# Patient Record
Sex: MALE | Race: WHITE | NOT HISPANIC OR LATINO | Employment: FULL TIME | URBAN - METROPOLITAN AREA
[De-identification: names, ages, dates, MRNs, and addresses within clinical notes are randomized per-mention and may not be internally consistent; named-entity substitution may affect disease eponyms.]

---

## 2020-02-12 ENCOUNTER — OFFICE VISIT CONVERTED (OUTPATIENT)
Dept: GASTROENTEROLOGY | Facility: CLINIC | Age: 45
End: 2020-02-12
Attending: NURSE PRACTITIONER

## 2020-03-20 ENCOUNTER — HOSPITAL ENCOUNTER (OUTPATIENT)
Dept: GASTROENTEROLOGY | Facility: HOSPITAL | Age: 45
Setting detail: HOSPITAL OUTPATIENT SURGERY
Discharge: HOME | End: 2020-03-20
Attending: INTERNAL MEDICINE

## 2020-04-06 ENCOUNTER — TELEMEDICINE CONVERTED (OUTPATIENT)
Dept: GASTROENTEROLOGY | Facility: CLINIC | Age: 45
End: 2020-04-06
Attending: NURSE PRACTITIONER

## 2020-06-18 ENCOUNTER — HOSPITAL ENCOUNTER (OUTPATIENT)
Dept: MRI IMAGING | Facility: HOSPITAL | Age: 45
Discharge: HOME | End: 2020-06-18
Attending: FAMILY MEDICINE

## 2020-11-25 ENCOUNTER — HOSPITAL ENCOUNTER (OUTPATIENT)
Dept: OTHER | Facility: HOSPITAL | Age: 45
Discharge: HOME | End: 2020-11-25
Attending: FAMILY MEDICINE

## 2020-12-01 ENCOUNTER — OFFICE VISIT CONVERTED (OUTPATIENT)
Dept: SURGERY | Facility: CLINIC | Age: 45
End: 2020-12-01
Attending: SURGERY

## 2020-12-04 ENCOUNTER — HOSPITAL ENCOUNTER (OUTPATIENT)
Dept: OTHER | Facility: HOSPITAL | Age: 45
Discharge: HOME | End: 2020-12-04
Attending: FAMILY MEDICINE

## 2021-05-10 NOTE — H&P
History and Physical      Patient Name: Ke House   Patient ID: [id number]   Sex: Male   YOB: 1975    Primary Care Provider: Dee Sanchez MD   Referring Provider: Carlos High MD    Visit Date: December 1, 2020    Provider: Darron Pryor MD   Location: [facility]   Location Address: [address] [place] [address]  [id number]   Location Phone: [phone]          Chief Complaint  · Hemorrhoids      History Of Present Illness  Ke House is a 45 year old /White male who presents to the office today as a consult from Carlos High MD.      Patient was referred for hemorrhoids.  The patient has had problems with bleeding on and off for at least a year.  He had a colonoscopy earlier this year and the findings were reported to be as normal besides grade 2 internal hemorrhoids.  Patient says the bleeding has stopped but he now has pain in his anus and for the past 2 weeks the pain has been fairly significant.  Patient says he does not sit on the toilet for long periods of time when he has a bowel movement and he says he drinks lots of water every day.  The only thing he is used for the hemorrhoids is Preparation H.       Past Medical History  Disease Name Date Onset Notes   Sleep apnea --  --          Past Surgical History  Procedure Name Date Notes   Hernia --  --          Allergy List  Allergen Name Date Reaction Notes   NO KNOWN DRUG ALLERGIES --  --  --        Allergies Reconciled  Family Medical History  Disease Name Relative/Age Notes   No family history of colorectal cancer  --    Family history of melanoma Father/   --          Social History  Finding Status Start/Stop Quantity Notes   Alcohol use Current some day --/-- --  rarely drinks, 1 drink per day, has been drinking for 21-30 years   lives with spouse --  --/-- --  --     --  --/-- --  --    Tobacco Former --/-- --  --    Working --  --/-- --  --          Review of  "Systems  · Constitutional  o Denies  o : fever, chills  · Cardiovascular  o Denies  o : chest pain on exertion  · Respiratory  o Denies  o : shortness of breath, cough  · Gastrointestinal  o Denies  o : nausea, vomiting      Vitals  Date Time BP Position Site L\R Cuff Size HR RR TEMP (F) WT  HT  BMI kg/m2 BSA m2 O2 Sat FR L/min FiO2 HC       12/01/2020 08:55 AM       14  195lbs 2oz 5'  9\" 28.81 2.08             Physical Examination  · Constitutional  o Appearance  o : healthy appearing, alert and in no acute distress, reliable historian, here alone  · Head and Face  o Head  o :   § Inspection  § : no visable deformities or lesions  · Eyes  o Conjunctivae  o : clear  o Sclerae  o : clear  · Neck  o Inspection/Palpation  o : normal appearance, no masses, trachea midline  · Respiratory  o Respiratory Effort  o : breathing unlabored, respiratory effort appears normal  o Inspection of Chest  o : normal appearance, no retractions  · Cardiovascular  o Heart  o : regular rate and rhythm  · Gastrointestinal  o Abdominal Examination  o :   § Abdomen  § : nondistended  · Skin and Subcutaneous Tissue  o General Inspection  o : no visible concerning rashes or lesions present  · Neurologic  o Cranial Nerves  o : no obvious motor deficits  o Sensation  o : no obvious sensory deficits  o Gait and Station  o :   § Gait Screening  § : normal gait, able to stand without diffculty  o Cerebellar Function  o : no obvious abnormalities  · Psychiatric  o Judgement and Insight  o : judgment and insight intact  o Mood and Affect  o : mood normal, affect appropriate     rectal:  not performed as patient had a picture on his phone he took earlier today and showed me.  the picture demonstrates circumferential internal and external hemorrhoids.           Assessment  · Hemorrhoids     455.6/K64.9      Plan  · Medications  o Medications have been Reconciled  o Transition of Care or Provider Policy  · Referrals  o ID: [id number] Date: 11/25/2020 Type: " Inbound  Specialty: General Surgery     Diagnosis of hemorrhoids was discussed with the patient.  I suspect based on the appearance of the hemorrhoids that ultimately the patient will need to have surgical hemorrhoidectomy but I think it is reasonable to try prescription medication first.  That said, I will prescribe hydrocortisone cream 1%, Primaxin topical 1%, and lidocaine topical 3%, and will give 1 refill for each of the 3 medicines.  The patient will try using the medicines for 1 month and schedule an appointment to see me again if he has no improvement.             Electronically Signed by: Darron Pryor MD -Author on December 1, 2020 09:20:27 AM

## 2021-05-12 NOTE — PROGRESS NOTES
"   Quick Note      Patient Name: Ke House   Patient ID: [id number]   Sex: Male   YOB: 1975    Primary Care Provider: Dee Sanchez MD   Referring Provider: Dee Sanchez MD    Visit Date: April 6, 2020    Provider: MARC Vera   Location: [facility]   Location Address: [address] [place] [address], [address]  [place], [place]  [id number]   Location Phone: [phone]          History Of Present Illness  The patient is a 44 year old /White male who presents on referral from Dee Sanchez MD for a gastroenterology evaluation.   TELEHEALTH VISIT  Chief Complaint: F/U Colonoscopy   Ke House is a 44 year old /White male who is presenting for evaluation via telehealth visit. Verbal consent obtained before beginning visit.   Provider spent 10 minutes with the patient during telehealth visit.   The following staff were present during this visit: Anastasiia Rios-MARC; Yaritza Womack   Past Medical History/Overview of Patient Symptoms     Patient states that he has not used the suppositories prescribed after colonoscopy because it was 'chaotic picking them up\". He has since picked them up however has not started them yet. Denies any rectal bleeding since colonoscopy.  Still having a bowel movement around 2-3 times daily, formed stool.  Denies any abdominal pain, nausea or weight loss.    Colonoscopy 3/20/2020: Normal mucosa in the terminal ileum.  7 mm polyp in the ascending colon.  Mild diverticulosis of the sigmoid colon.  Grade 2 internal hemorrhoids.  External hemorrhoids tags noted.  Ascending colon polyp biopsysessile serrated lesion.  Colon recall in 5 years.             Assessment  · Colon polyp     211.3/K63.5  · Diverticulosis     562.10/K57.90  · Internal hemorrhoids     455.0/K64.8  · Hemorrhoids     455.6/K64.9      Plan  · Orders  o Physican Telephone evaluation, 5-10 min ([id number]) - - 04/06/2020  · Medications  o Medications have been " Reconciled  o Transition of Care or Provider Policy  · Instructions  o Plan Of Care:   o Patient instructed to seek medical attention urgently for new or worsening symptoms.  o Patient was educated/instructed on their diagnosis, treatment and medications prior to discharge from the clinic today.  o Call the office with any concerns or questions.  o Encouraged patient to use rx Anusol suppositories for grade 2 internal hemorrhoids seen on colonoscopy.  o Electronically Identified Patient Education Materials Provided Electronically  · Disposition  o Call or Return if symptoms worsen or persist.  o Follow up PRN-Call if any change in bowel pattern, abdominal pain, rectal bleeding, or any new GI complaint  · Referrals  o ID: [id number] Date: 02/11/2020 Type: Inbound  Specialty: Gastroenterology            Electronically Signed by: MARC Vera -Author on April 6, 2020 09:24:09 AM

## 2021-05-14 ENCOUNTER — OFFICE VISIT CONVERTED (OUTPATIENT)
Dept: SURGERY | Facility: CLINIC | Age: 46
End: 2021-05-14
Attending: SURGERY

## 2021-05-14 VITALS — BODY MASS INDEX: 28.9 KG/M2 | HEIGHT: 69 IN | WEIGHT: 195.12 LBS | RESPIRATION RATE: 14 BRPM

## 2021-05-15 VITALS
DIASTOLIC BLOOD PRESSURE: 79 MMHG | BODY MASS INDEX: 21.69 KG/M2 | HEIGHT: 78 IN | WEIGHT: 187.5 LBS | SYSTOLIC BLOOD PRESSURE: 130 MMHG

## 2021-05-15 VITALS — WEIGHT: 183 LBS | BODY MASS INDEX: 27.11 KG/M2 | HEIGHT: 69 IN

## 2021-05-18 ENCOUNTER — HOSPITAL ENCOUNTER (OUTPATIENT)
Dept: OTHER | Facility: HOSPITAL | Age: 46
Discharge: HOME | End: 2021-05-18
Attending: FAMILY MEDICINE

## 2021-05-25 ENCOUNTER — OFFICE VISIT CONVERTED (OUTPATIENT)
Dept: NEUROLOGY | Facility: CLINIC | Age: 46
End: 2021-05-25
Attending: NURSE PRACTITIONER

## 2021-05-28 ENCOUNTER — CONVERSION ENCOUNTER (OUTPATIENT)
Dept: ORTHOPEDIC SURGERY | Facility: CLINIC | Age: 46
End: 2021-05-28

## 2021-05-28 ENCOUNTER — OFFICE VISIT CONVERTED (OUTPATIENT)
Dept: ORTHOPEDIC SURGERY | Facility: CLINIC | Age: 46
End: 2021-05-28
Attending: ORTHOPAEDIC SURGERY

## 2021-06-05 NOTE — H&P
History and Physical      Patient Name: Ke House   Patient ID: [id number]   Sex: Male   YOB: 1975    Primary Care Provider: Dee Sanchez MD   Referring Provider: Carlos High MD    Visit Date: May 14, 2021    Provider: Darron Pryor MD   Location: [facility]   Location Address: [address] [place] [address]  [id number]   Location Phone: [phone]          Chief Complaint  · Hemorrhoids      History Of Present Illness  Ke House is a 45 year old /White male who presents to the office today as a consult from one of the local ER clinicians      patient was in ER recently for anal pain and dxd w/ a thrombosed external hemorrhoid.  He has been using tucks wipes and hydrocortisone ointment.  The anal pain is much improved now.  Pt previously saw me on 12/1/20 for hemorrhoids.  A copy and paste of the hpi section and plan section from that office note is available below.    copy and paste of hpi from 12/1/20 office visit  Patient was referred for hemorrhoids.  The patient has had problems with bleeding on and off for at least a year.  He had a colonoscopy earlier this year and the findings were reported to be as normal besides grade 2 internal hemorrhoids.  Patient says the bleeding has stopped but he now has pain in his anus and for the past 2 weeks the pain has been fairly significant.  Patient says he does not sit on the toilet for long periods of time when he has a bowel movement and he says he drinks lots of water every day.  The only thing he is used for the hemorrhoids is Preparation H.    copy and paste of hpi from 12/1/20 office visit  Diagnosis of hemorrhoids was discussed with the patient.  I suspect based on the appearance of the hemorrhoids that ultimately the patient will need to have surgical hemorrhoidectomy but I think it is reasonable to try prescription medication first.  That said, I will prescribe hydrocortisone cream 1%, Primaxin  "topical 1%, and lidocaine topical 3%, and will give 1 refill for each of the 3 medicines.  The patient will try using the medicines for 1 month and schedule an appointment to see me again if he has no improvement.       Past Medical History  Disease Name Date Onset Notes   Sleep apnea --  --          Past Surgical History  Procedure Name Date Notes   Hernia --  --          Medication List  Name Date Started Instructions   hydrocortisone acetate 25 mg rectal suppository  insert 1 suppository (25 mg) by rectal route 2 times per day         Allergy List  Allergen Name Date Reaction Notes   NO KNOWN DRUG ALLERGIES --  --  --        Allergies Reconciled  Family Medical History  Disease Name Relative/Age Notes   No family history of colorectal cancer  --    Family history of melanoma Father/   --          Social History  Finding Status Start/Stop Quantity Notes   Alcohol use Current some day --/-- --  rarely drinks, 1 drink per day, has been drinking for 21-30 years   lives with spouse --  --/-- --  --     --  --/-- --  --    Tobacco Former --/-- --  --    Working --  --/-- --  --          Review of Systems  · Constitutional  o Denies  o : fever, chills  · Cardiovascular  o Denies  o : chest pain on exertion  · Respiratory  o Denies  o : shortness of breath, cough  · Gastrointestinal  o Denies  o : nausea, vomiting      Vitals  Date Time BP Position Site L\R Cuff Size HR RR TEMP (F) WT  HT  BMI kg/m2 BSA m2 O2 Sat FR L/min FiO2        05/14/2021 02:17 PM       16  188lbs 8oz 5'  9\" 27.84 2.04             Physical Examination  · Constitutional  o Appearance  o : alert and in no acute distress, reliable historian, here alone  · Head and Face  o Head  o :   § Inspection  § : no visable deformities or lesions  · Eyes  o Conjunctivae  o : clear  o Sclerae  o : clear  · Neck  o Inspection/Palpation  o : normal appearance, no masses, trachea midline  · Respiratory  o Respiratory Effort  o : breathing unlabored, " respiratory effort appears normal  o Inspection of Chest  o : normal appearance, no retractions  · Cardiovascular  o Heart  o : regular rate and rhythm  · Gastrointestinal  o Abdominal Examination  o :   § Abdomen  § : nondistended  · Skin and Subcutaneous Tissue  o General Inspection  o : no visible concerning rashes or lesions present  · Neurologic  o Cranial Nerves  o : no obvious motor deficits  o Sensation  o : no obvious sensory deficits  o Gait and Station  o :   § Gait Screening  § : normal gait, able to stand without diffculty  o Cerebellar Function  o : no obvious abnormalities  · Psychiatric  o Judgement and Insight  o : judgment and insight intact  o Mood and Affect  o : mood normal, affect appropriate     anal:  at the right lateral anus, there is a moderate size, soft, mildly tender external hemorrhoid with no skin necrosis/ischemia           Assessment  · Hemorrhoids     455.6/K64.9      Plan  · Medications  o Medications have been Reconciled  o Transition of Care or Provider Policy  · Instructions  o Electronically Identified Patient Education Materials Provided Electronically  · Referrals  o ID: [id number] Date: 11/25/2020 Type: Inbound  Specialty: General Surgery     the thrombosed hemorrhoid is improving satisfactorily with medical management.  no change in treatment right now.  pt will consider proceeding with hemorrhoidectomy sometime later this year and will schedule an appointment to see me again if he decides to do so.             Electronically Signed by: Darron Pryor MD -Author on May 14, 2021 02:39:42 PM

## 2021-06-05 NOTE — H&P
History and Physical      Patient Name: Ke House   Patient ID: [id number]   Sex: Male   YOB: 1975    Primary Care Provider: Carlos High MD   Referring Provider: Carlos High MD    Visit Date: May 28, 2021    Provider: Stone Michelle MD   Location: [facility]   Location Address: [address] [place] [address]  [id number]   Location Phone: [phone]          Chief Complaint  · Left shoulder injury      History Of Present Illness  Ke House is a 45 year old /White male who presents today to [facility].      The patient presents here today for evaluation of his left shoulder. The patient is in the army. The patient states his left arm goes numb when in the internal rotation position. He reports pain to the shoulder as well. The patient has attended physical therapy and Yoga without relief. The patient is left hand dominate.  HE has been having shoulder pain for several years.       Past Medical History  High cholesterol; Leg pain; Lumbar pain; Migraines; Muscle cramps; Sleep apnea         Past Surgical History  Hernia         Allergy List  NO KNOWN DRUG ALLERGIES       Allergies Reconciled  Family Medical History  Family history of Arthritis; Family history of melanoma; Family history of osteoporosis         Social History  Alcohol use (Current some day); lives with spouse; ; Tobacco (Former); Working         Review of Systems  · Constitutional  o Denies  o : fever, chills, weight loss  · Cardiovascular  o Denies  o : chest pain, shortness of breath  · Gastrointestinal  o Denies  o : liver disease, heartburn, nausea, blood in stools  · Genitourinary  o Denies  o : painful urination, blood in urine  · Integument  o Denies  o : rash, itching  · Neurologic  o Denies  o : headache, weakness, loss of consciousness  · Musculoskeletal  o Denies  o : painful, swollen joints  · Psychiatric  o Denies  o : drug/alcohol addiction, anxiety,  "depression      Vitals  Date Time BP Position Site L\R Cuff Size HR RR TEMP (F) WT  HT  BMI kg/m2 BSA m2 O2 Sat FR L/min FiO2 HC       05/28/2021 09:03 AM      55 - R   184lbs 0oz 5'  9\" 27.17 2.02 98 %            Physical Examination  · Constitutional  o Appearance  o : well developed, well-nourished, no obvious deformities present  · Head and Face  o Head  o :   § Inspection  § : normocephalic  o Face  o :   § Inspection  § : no facial lesions  · Eyes  o Conjunctivae  o : conjunctivae normal  o Sclerae  o : sclerae white  · Ears, Nose, Mouth and Throat  o Ears  o :   § External Ears  § : appearance within normal limits  § Hearing  § : intact  o Nose  o :   § External Nose  § : appearance normal  · Neck  o Inspection/Palpation  o : normal appearance  o Range of Motion  o : full range of motion  · Respiratory  o Respiratory Effort  o : breathing unlabored  o Inspection of Chest  o : normal appearance  o Auscultation of Lungs  o : no audible wheezing or rales  · Cardiovascular  o Heart  o : regular rate  · Gastrointestinal  o Abdominal Examination  o : soft and non-tender  · Skin and Subcutaneous Tissue  o General Inspection  o : intact, no rashes  · Psychiatric  o General  o : Alert and oriented x3  o Judgement and Insight  o : judgment and insight intact  o Mood and Affect  o : mood normal, affect appropriate  · Left Shoulder  o Inspection  o : Tender to anterior shoulder. Tender of bicipital grove. Mild tenderness over acromioclavicular joint. . Abduction 110. ER 85. IR 60. 5/5 Supraspinatus strength. Positive [place]. Positive Obriens. IR to L3.   · In Office Procedures  o View  o : AP/LATERAL  o Site  o : left, shoulder   o Indication  o : Left shoulder pain   o Study  o : X-rays ordered, taken in the office, and reviewed today.  o Xray  o : mild degenerative changes to the glenohumeral joint and acromial joint with spurring humeral head   o Comparative Data  o : No comparative data " found  · Imaging  o Imaging  o : MRI [facility] 11/2020- mild infraspinatus and biceps tendonopathy. Mild to moderate glenohumeral joint chondromalacia. Age appropriate degenerative changes to acromioclavicular joint. Near circumferential complex brenda tear with a multilobulates posterior/superior paralabral cyst.           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  o Call or return if worsening symptoms.  o Discussed surgery.  o Risks/benefits discussed with patient including, but not limited to: infection, bleeding, neurovascular damage, malunion, nonunion, aesthetic deformity, need for further surgery, and death.  o Surgery pamphlet given.  o The above service was scribed by Radha Peters on my behalf and I attest to the accuracy of the note. jsb  o Discussed the treatment options with the patient, operative vs non-operative. Discussed the risks and benefits of operative treatment with the patient. The patient expressed understanding and wished to proceed. Plan for left shoulder arthroscopic labral repair, excision of paralabral cyst, chondroplasty with possible biceps tenodesis. Follow up 2 weeks post-operatively.  o Electronically Identified Patient Education Materials Provided Electronically  · Referrals  o ID: [id number] Date: 05/28/2021 Type: Inbound  Specialty: Orthopedic Surgery            Electronically Signed by: Radha Peters MA -Author on May 28, 2021 10:17:26 AM  Electronically Co-signed by: Stone Michelle MD -Reviewer on May  31, 2021 02:55:13 PM

## 2021-06-05 NOTE — H&P
History and Physical      Patient Name: Ke House   Patient ID: [id number]   Sex: Male   YOB: 1975    Primary Care Provider: Carlos High MD   Referring Provider: Carlos High MD    Visit Date: May 25, 2021    Provider: MARC Bowman   Location: [facility]   Location Address: [address] [place] [address]  [id number]   Location Phone: [phone]          Chief Complaint  · Back pain     New patient presenting with low back pain. MRIs of Thoracic and Lumbar spine conducted at [facility] in 12/2020       History Of Present Illness  The patient is a 45 year old /White male, who presents on referral from Carlos High MD, for a neurosurgical evaluation of low back pain.   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 It is 6/10 in severity has a sharp quality and does not radiate. The pain has been constant. The pain tends to be maximal at no specific time, but waxes and wanes in severity throughout the day. The patient states the pain is aggravated by prolonged sitting, prolonged standing, and running. It is alleviated by rest.   He also reports leg pain. The pain is mild, intermittent, involves both legs, and has a dull aching and numbness quality. It is aggravated by nothing in particular, and alleviated by nothing in particular. He denies bowel or bladder dysfunction. The patient has no prior history of neck or back surgery.   RECENT INTERVENTIONS:  He has been previously treated with physical therapy and chiropractic management. The physical therapy was was previously effective, though no longer provided benefit in relieving the pain. The chiropractic treatments were ineffective.   INFORMATION REVIEWED:  The following information was reviewed: radiology reports and images. MRI of the lumbar spine and MRI of the thoracic  "spine revealed significant multilevel spondylosis with degenerative end plate changes noted in the lumbar spine as well as DDD, most significant at L4/5 with osteophyte formation causing right sided paracentral disc protrusion result in mild canal narrowing with effacement of the right lateral recess and severe foraminal narrowing on the right, moderate on the left. These are the most notable findings.       Past Medical History  High cholesterol; Leg pain; Lumbar pain; Migraines; Muscle cramps; Sleep apnea         Past Surgical History  Hernia         Allergy List  NO KNOWN DRUG ALLERGIES       Allergies Reconciled  Family Medical History  Family history of Arthritis; Family history of melanoma; Family history of osteoporosis         Social History  Alcohol use (Current some day); lives with spouse; ; Tobacco (Former); Working         Review of Systems  · Eyes  o Admits  o : blurred vision, changes in vision  · HENT  o Admits  o : nasal congestion, hearing loss, ringing in ears  · Respiratory  o Admits  o : shortness of breath  · Neurologic  o Admits  o : loss of balance, headache, difficulty with sleep, numbness/tingling/paresthesia  · Musculoskeletal  o Admits  o : joint pain, spasms, sciatica, neck stiffness/pain, muscle aches, low back pain  · All Others Negative      Vitals  Date Time BP Position Site L\R Cuff Size HR RR TEMP (F) WT  HT  BMI kg/m2 BSA m2 O2 Sat FR L/min FiO2        05/25/2021 01:40 /83 Sitting    70 - R 91 97.3 187lbs 4oz 5'  9\" 27.65 2.03 100 %            Physical Examination  · Constitutional  o Appearance  o : well-nourished, well developed, alert, in no acute distress  · Respiratory  o Respiratory Effort  o : breathing unlabored  · Cardiovascular  o Peripheral Vascular System  o :   § Extremities  § : no edema or cyanosis  · Musculoskeletal  o Spine  o :   § Inspection/Palpation  § : TTP in the thoracic and lumbar paraspinals  o Right Lower Extremity  o : "   § Inspection/Palpation  § : TTP at the right greater trochanteric bursa  § Joint Stability  § : joint stability within normal limits  § Range of Motion  § : range of motion normal, no joint crepitations present, no pain on motion, Darian's test positive  o Left Lower Extremity  o :   § Inspection/Palpation  § : tenderness to palpation at the left SI joint and greater trochanteric bursa, no edema present, no ecchymosis  § Joint Stability  § : joint stability within normal limits  § Range of Motion  § : range of motion normal, no joint crepitations present, no pain on motion, Darian's test negative  · Skin and Subcutaneous Tissue  o Extremities  o :   § Right Lower Extremity  § : no lesions or areas of discoloration  § Left Lower Extremity  § : no lesions or areas of discoloration  o Back  o : no lesions or areas of discoloration  · Neurologic  o Mental Status Examination  o :   § Orientation  § : alert and oriented to time, person, place and events  o Motor Examination  o :   § RLE Strength  § : strength normal  § RLE Motor Function  § : tone normal, no atrophy, no abnormal movements noted  § LLE Strength  § : strength normal  § LLE Motor Function  § : tone normal, no atrophy, no abnormal movements noted  o Reflexes  o :   § RLE  § : knee and ankle reflexes 2/4, SLR negative  § LLE  § : knee and ankle reflexes 2/4, SLR negative  o Sensation  o :   § Light Touch  § : sensation intact to light touch in extremities  o Gait and Station  o :   § Gait Screening  § : normal gait, able to stand without difficulty  · Psychiatric  o Mood and Affect  o : mood normal, affect appropriate          Assessment  · Degeneration of lumbosacral intervertebral disc     722.52/M51.37  · Facet arthropathy, lumbar     721.3/M46.96  · Lumbago/low back pain     724.3/M54.40  · Paresthesia     782.0/R20.2  · Spondylosis, lumbar     721.42/M47.16       Pt who is active  with prior back injury during a deployment over 15 years ago,  here to discuss his MRI Lumbar Spine and chronic back pain. MRI Lumbar Spine shows significant degenerative changes throughout, with DDD, end plate changes with osteophyte formation, and facet arthropathy quite advanced for his age. Most significant area would be at L4/5. He does not have much in the way of radiating pain into the legs and we discussed surgery would likely not be of benefit to him at this point. His pain is primarily within the back. Will refer to pain management for trigger point injections and RFA.    He remains quite active with the . We discussed continued degeneration of the bones and joints with continued long distance running and heavy lifting. Would recommend avoiding this type of exercise, encourage walking, recumbent back, etc for cardio.       Plan  · Orders  o PAIN MANAGEMENT CONSULTATION (PAINM) - 724.3/M54.40, 721.42/M47.16, 721.3/M46.96 - 05/25/2021   [facility] in [place]. Eval for trigger point injections and RFA in lumbar spine  · Instructions  o Encouraged to follow-up with Primary Care Provider for preventative care.  o The ROS and the PFSH were reviewed at today's visit.  o I have discussed the risks and benefits of surgery versus physical therapy, epidural steroids, and other conservative forms of treatment.  o Handouts provided: Non-Surgical Treatments for Back Pain/Degenerative Disc Disease.  o We have discussed the benefits of core strengthening. Patient will work on improving the core muscle strength with low impact activities such as walking, swimming, Pilates, etc.   o Call or return to office if symptoms worsen or persist.  o Referral to Pain Management for RFA.  o Follow up in 6-8 weeks.            Electronically Signed by: MARC Bowman -Author on May 25, 2021 06:11:06 PM

## 2021-07-01 ENCOUNTER — OFFICE VISIT (OUTPATIENT)
Dept: ORTHOPEDIC SURGERY | Facility: CLINIC | Age: 46
End: 2021-07-01

## 2021-07-01 VITALS — WEIGHT: 187.2 LBS | HEIGHT: 69 IN | OXYGEN SATURATION: 98 % | BODY MASS INDEX: 27.73 KG/M2 | HEART RATE: 70 BPM

## 2021-07-01 DIAGNOSIS — R22.41 KNEE MASS, RIGHT: ICD-10-CM

## 2021-07-01 DIAGNOSIS — M25.561 RIGHT KNEE PAIN, UNSPECIFIED CHRONICITY: ICD-10-CM

## 2021-07-01 DIAGNOSIS — S83.282A ACUTE LATERAL MENISCUS TEAR OF LEFT KNEE, INITIAL ENCOUNTER: Primary | ICD-10-CM

## 2021-07-01 PROCEDURE — 99213 OFFICE O/P EST LOW 20 MIN: CPT | Performed by: ORTHOPAEDIC SURGERY

## 2021-07-01 NOTE — PROGRESS NOTES
"Chief Complaint  Pain of the Left Knee and Pain of the Right Knee     Subjective      Ke House presents to [facility] for evaluation of bilateral knee pain. He has previously had an MRI of both knees at [facility]. We reviewed those results with the patient today. He describes the pain as a burning pain. He reports it feels swollen after running. He hasn't taken any medication for the pain. He has been having pain for several months. He reports the knees are equal in pain and alternate sides.     No Known Allergies     Social History     Socioeconomic History   • Marital status:      Spouse name: Not on file   • Number of children: Not on file   • Years of education: Not on file   • Highest education level: Not on file        Review of Systems     Objective   Vital Signs:   Pulse 70   Ht 175.3 cm (69\")   Wt 84.9 kg (187 lb 3.2 oz)   SpO2 98%   BMI 27.64 kg/m²       Physical Exam  Constitutional:       Appearance: Normal appearance. He is well-developed and normal weight.   HENT:      Head: Normocephalic.      Right Ear: Hearing and external ear normal.      Left Ear: Hearing and external ear normal.      Nose: Nose normal.   Eyes:      Conjunctiva/sclera: Conjunctivae normal.   Cardiovascular:      Rate and Rhythm: Normal rate.   Pulmonary:      Effort: Pulmonary effort is normal.      Breath sounds: No wheezing or rales.   Abdominal:      Palpations: Abdomen is soft.      Tenderness: There is no abdominal tenderness.   Musculoskeletal:      Cervical back: Normal range of motion.   Skin:     Findings: No rash.   Neurological:      Mental Status: He is alert and oriented to person, place, and time.   Psychiatric:         Mood and Affect: Mood and affect normal.         Judgment: Judgment normal.       Ortho Exam      Left knee- pain to anterior inferior left knee. No effusion. Tender over the inferior patella. Full extension. Flexion 135 degrees. Stable to varus/valgus stress. " Stable to anterior/posterior drawer. Neurovascularly intact. Lateral joint line tenderness. Negative Molly's     Right knee- ROM 0-135 degrees. Stable to varus/valgus stress. Stable to anterior/posterior drawer. Tender to the anterior lateral knee. Neurovascularly intact. Negative Molly's.     Procedures    [facility] MRI Right knee- MR examination of the right knee without IV contrast demonstrating no internal derangement or     degenerative change.         Serpiginous structures are seen in the quadriceps fat pad with a nonspecific appearance.  Consider     obtaining MR examination with IV contrast, if these could be the source of the patient's symptoms.      A right knee series could be helpful to evaluate for calcifications.        Left knee MRI [facility]- MR examination of the left knee demonstrating small, nondisplaced vertically oriented longitudinal     tear in the body lateral meniscus.        Imaging Results (Most Recent)     None           Result Review :       No results found.           Assessment and Plan     DX: Left knee lateral meniscus tear, Right knee mass    Discussed the treatment plan with the patient.  Plan for conservative treatment for the left knee. Plan for MRI with/without contrast to evaluate the right knee mass.     Call or return if worsening symptoms.    Follow Up     Follow up after MRI      Patient was given instructions and counseling regarding his condition or for health maintenance advice. Please see specific information pulled into the AVS if appropriate.     Scribed for Stone Michelle MD by Radha Peters.  07/01/21   13:23 EDT    I have personally performed the services described in this document as scribed by the above individual and it is both accurate and complete.  Stone Michelle MD 07/01/21  13:23 EDT

## 2021-07-12 ENCOUNTER — PREP FOR SURGERY (OUTPATIENT)
Dept: OTHER | Facility: HOSPITAL | Age: 46
End: 2021-07-12

## 2021-07-12 RX ORDER — CEFAZOLIN SODIUM 2 G/100ML
2 INJECTION, SOLUTION INTRAVENOUS ONCE
Status: CANCELLED | OUTPATIENT
Start: 2021-07-12 | End: 2021-07-12

## 2021-07-12 RX ORDER — CEFAZOLIN SODIUM IN 0.9 % NACL 3 G/100 ML
3 INTRAVENOUS SOLUTION, PIGGYBACK (ML) INTRAVENOUS ONCE
Status: CANCELLED | OUTPATIENT
Start: 2021-07-12 | End: 2021-07-12

## 2021-07-15 VITALS — WEIGHT: 188.5 LBS | BODY MASS INDEX: 27.92 KG/M2 | HEIGHT: 69 IN | RESPIRATION RATE: 16 BRPM

## 2021-07-15 VITALS — HEART RATE: 55 BPM | OXYGEN SATURATION: 98 % | WEIGHT: 184 LBS | HEIGHT: 69 IN | BODY MASS INDEX: 27.25 KG/M2

## 2021-07-15 VITALS
HEIGHT: 69 IN | WEIGHT: 187.25 LBS | OXYGEN SATURATION: 100 % | BODY MASS INDEX: 27.74 KG/M2 | TEMPERATURE: 97.3 F | DIASTOLIC BLOOD PRESSURE: 83 MMHG | HEART RATE: 70 BPM | SYSTOLIC BLOOD PRESSURE: 125 MMHG

## 2021-07-29 ENCOUNTER — APPOINTMENT (OUTPATIENT)
Dept: MRI IMAGING | Facility: HOSPITAL | Age: 46
End: 2021-07-29

## 2021-07-29 ENCOUNTER — OFFICE VISIT (OUTPATIENT)
Dept: NEUROSURGERY | Facility: CLINIC | Age: 46
End: 2021-07-29

## 2021-07-29 VITALS
WEIGHT: 186.3 LBS | HEART RATE: 78 BPM | DIASTOLIC BLOOD PRESSURE: 98 MMHG | HEIGHT: 69 IN | SYSTOLIC BLOOD PRESSURE: 151 MMHG | OXYGEN SATURATION: 100 % | BODY MASS INDEX: 27.59 KG/M2

## 2021-07-29 DIAGNOSIS — M47.817 SPONDYLOSIS OF LUMBOSACRAL REGION WITHOUT MYELOPATHY OR RADICULOPATHY: Primary | ICD-10-CM

## 2021-07-29 DIAGNOSIS — M47.816 FACET ARTHROPATHY, LUMBAR: ICD-10-CM

## 2021-07-29 PROCEDURE — 99212 OFFICE O/P EST SF 10 MIN: CPT | Performed by: NURSE PRACTITIONER

## 2021-07-29 RX ORDER — MULTIPLE VITAMINS W/ MINERALS TAB 9MG-400MCG
TAB ORAL
COMMUNITY

## 2021-07-29 NOTE — PROGRESS NOTES
Chief Complaint  Back Pain and Follow-up (has not been seen by pain management yet)    Subjective          Ke House who is a 46 y.o. year old male who presents to [facility] for follow up of his low back pain.      Pt scheduled for RFA trail on August 12th. Back pain symptoms remain the symptom. He again denies any radiating pain into the legs. He does not take anything for pain and is not interested in pain medication.       Interval History 5/25/21  The patient is a 45 year old /White male, who presents on referral from Carlos High MD, for a neurosurgical evaluation of low back pain.   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 It is 6/10 in severity has a sharp quality and does not radiate. The pain has been constant. The pain tends to be maximal at no specific time, but waxes and wanes in severity throughout the day. The patient states the pain is aggravated by prolonged sitting, prolonged standing, and running. It is alleviated by rest.   He also reports leg pain. The pain is mild, intermittent, involves both legs, and has a dull aching and numbness quality. It is aggravated by nothing in particular, and alleviated by nothing in particular. He denies bowel or bladder dysfunction. The patient has no prior history of neck or back surgery.   RECENT INTERVENTIONS:  He has been previously treated with physical therapy and chiropractic management. The physical therapy was was previously effective, though no longer provided benefit in relieving the pain. The chiropractic treatments were ineffective.   INFORMATION REVIEWED:  The following information was reviewed: radiology reports and images. MRI of the lumbar spine and MRI of the thoracic spine revealed significant multilevel spondylosis with degenerative end plate changes noted in the  "lumbar spine as well as DDD, most significant at L4/5 with osteophyte formation causing right sided paracentral disc protrusion result in mild canal narrowing with effacement of the right lateral recess and severe foraminal narrowing on the right, moderate on the left. These are the most notable findings.     Recent Interventions: Referred to pain management for RFA      Review of Systems   Musculoskeletal: Positive for arthralgias, back pain and myalgias.   All other systems reviewed and are negative.       Objective   Vital Signs:   /98   Pulse 78   Ht 175.3 cm (69\")   Wt 84.5 kg (186 lb 4.8 oz)   SpO2 100%   BMI 27.51 kg/m²       Physical Exam  Vitals reviewed.   Constitutional:       Appearance: Normal appearance.   Neurological:      Mental Status: He is alert and oriented to person, place, and time.      Gait: Gait is intact.      Deep Tendon Reflexes: Strength normal.        Neurologic Exam     Mental Status   Oriented to person, place, and time.   Level of consciousness: alert    Motor Exam   Muscle bulk: normal  Overall muscle tone: normal    Strength   Strength 5/5 throughout.     Gait, Coordination, and Reflexes     Gait  Gait: normal       Result Review :                 Assessment and Plan    Diagnoses and all orders for this visit:    1. Spondylosis of lumbosacral region without myelopathy or radiculopathy (Primary)    2. Facet arthropathy, lumbar    He is scheduled with pain management for RFA trial. No surgical recommendations at this time. He follow up on an as needed basis.       Follow Up   Return if symptoms worsen or fail to improve.  Patient was given instructions and counseling regarding his condition or for health maintenance advice.     -Continue with pain management for RFA trial  -Follow up as needed  "

## 2021-08-05 ENCOUNTER — APPOINTMENT (OUTPATIENT)
Dept: MRI IMAGING | Facility: HOSPITAL | Age: 46
End: 2021-08-05

## 2021-08-17 ENCOUNTER — HOSPITAL ENCOUNTER (OUTPATIENT)
Dept: MRI IMAGING | Facility: HOSPITAL | Age: 46
Discharge: HOME | End: 2021-08-17
Admitting: ORTHOPAEDIC SURGERY

## 2021-08-17 DIAGNOSIS — M25.561 RIGHT KNEE PAIN, UNSPECIFIED CHRONICITY: ICD-10-CM

## 2021-08-17 DIAGNOSIS — R22.41 KNEE MASS, RIGHT: ICD-10-CM

## 2021-08-17 PROCEDURE — 0 GADOBENATE DIMEGLUMINE 529 MG/ML SOLUTION: Performed by: ORTHOPAEDIC SURGERY

## 2021-08-17 PROCEDURE — 73723 MRI JOINT LWR EXTR W/O&W/DYE: CPT

## 2021-08-17 PROCEDURE — A9577 INJ MULTIHANCE: HCPCS | Performed by: ORTHOPAEDIC SURGERY

## 2021-08-17 RX ADMIN — GADOBENATE DIMEGLUMINE 17 ML: 529 INJECTION, SOLUTION INTRAVENOUS at 19:29

## 2021-08-19 ENCOUNTER — OFFICE VISIT (OUTPATIENT)
Dept: ORTHOPEDIC SURGERY | Facility: CLINIC | Age: 46
End: 2021-08-19

## 2021-08-19 VITALS — RESPIRATION RATE: 14 BRPM | BODY MASS INDEX: 26.22 KG/M2 | HEIGHT: 69 IN | WEIGHT: 177 LBS

## 2021-08-19 DIAGNOSIS — M71.21 SYNOVIAL CYST OF KNEE, RIGHT: Primary | ICD-10-CM

## 2021-08-19 PROCEDURE — 99213 OFFICE O/P EST LOW 20 MIN: CPT | Performed by: ORTHOPAEDIC SURGERY

## 2021-08-19 NOTE — PROGRESS NOTES
"Chief Complaint  Pain of the Right Knee     Subjective      Ke House presents to [facility] for follow up evaluation of the right knee. He recently had an MRI with contrast to evaluate a cyst and we reviewed that with the patient today. He describes the pain as a burning pain. He reports it feels swollen after running. He hasn't taken any medication for the pain. He has been having pain for several months. He reports his left knee is more painful than his right knee. He had a previous MRI of the left knee that revealed a lateral meniscus tear.     No Known Allergies     Social History     Socioeconomic History   • Marital status:      Spouse name: Not on file   • Number of children: Not on file   • Years of education: Not on file   • Highest education level: Not on file   Tobacco Use   • Smoking status: Never Smoker   • Smokeless tobacco: Never Used        Review of Systems     Objective   Vital Signs:   Resp 14   Ht 175.3 cm (69\")   Wt 80.3 kg (177 lb)   BMI 26.14 kg/m²       Physical Exam  Constitutional:       Appearance: Normal appearance. He is well-developed and normal weight.   HENT:      Head: Normocephalic.      Right Ear: Hearing and external ear normal.      Left Ear: Hearing and external ear normal.      Nose: Nose normal.   Eyes:      Conjunctiva/sclera: Conjunctivae normal.   Cardiovascular:      Rate and Rhythm: Normal rate.   Pulmonary:      Effort: Pulmonary effort is normal.      Breath sounds: No wheezing or rales.   Abdominal:      Palpations: Abdomen is soft.      Tenderness: There is no abdominal tenderness.   Musculoskeletal:      Cervical back: Normal range of motion.   Skin:     Findings: No rash.   Neurological:      Mental Status: He is alert and oriented to person, place, and time.   Psychiatric:         Mood and Affect: Mood and affect normal.         Judgment: Judgment normal.       Ortho Exam      Right knee- Right knee- ROM 0-135 degrees. " Stable to varus/valgus stress. Stable to anterior/posterior drawer. Tender to the anterior lateral knee. Neurovascularly intact. Negative Molly's.     Procedures      Imaging Results (Most Recent)     None           Result Review :       MRI Knee Right With & Without Contrast    Result Date: 8/18/2021  Narrative: PROCEDURE: MRI KNEE RIGHT W WO CONTRAST  COMPARISON: [facility], MR, MRI RIGHT KNEE WO CONTRAST, 5/18/2021, 11:06.  INDICATIONS: Right knee pain  CONTRAST: 17 ML  Multihance I.V.  TECHNIQUE: A variety of imaging planes and parameters were utilized for visualization of suspected pathology.  Images were performed without and with intravenous contrast.   FINDINGS:  The cruciate ligaments, collateral ligaments, and extensor mechanism of the knee are intact. There is no evidence for medial or lateral meniscal tear.  No significant joint effusion is observed. No significant articular cartilage defects are identified. There is no evidence for osteochondral injury. No displaced osteochondral fragments are seen. No significant focal abnormal bone marrow signal or enhancement is identified. The cortical margins are intact.  As noted on the prior MRI, there is a multilobulated fluid signal focus within the soft tissues just posterior to the quadriceps tendon.  This finding extends superior to the suprapatellar bursa.  This finding measures approximately 2.8 x 2.1 cm.  There is evidence for a connection to the joint space.  There is no enhancement on the postcontrast images.  The findings are most consistent with a synovial cyst extending into the soft tissues along the superior margin of the suprapatellar bursa.  Changes secondary to a ganglion cyst are felt less likely.  The adjacent quadriceps tendon is unremarkable in appearance.  CONCLUSION:  1. No evidence for ligament or tendon derangement. 2. Findings most consistent with a synovial cyst along the superior margin of the suprapatellar bursa  just posterior to the quadriceps tendon.  A ganglion cyst is felt less likely.       PRASANNA GARCIA MD       Electronically Signed and Approved By: PRASANNA GARCIA MD on 8/18/2021 at 9:53                      Assessment and Plan     DX: Synovial cyst of knee, right    Discussed the treatment plan with the patient.  Plan for conservative management for both knees.     Call or return if worsening symptoms.    Follow Up     PRN      Patient was given instructions and counseling regarding his condition or for health maintenance advice. Please see specific information pulled into the AVS if appropriate.     Scribed for Stone Michelle MD by Radha Peters.  08/19/21   11:39 EDT    I have personally performed the services described in this document as scribed by the above individual and it is both accurate and complete. Stone Michelle MD 08/22/21

## 2021-08-24 ENCOUNTER — TRANSCRIBE ORDERS (OUTPATIENT)
Dept: ADMINISTRATIVE | Facility: HOSPITAL | Age: 46
End: 2021-08-24

## 2021-08-24 DIAGNOSIS — M54.2 CERVICALGIA: Primary | ICD-10-CM

## 2021-08-24 DIAGNOSIS — R20.2 PARESTHESIA: ICD-10-CM

## 2021-09-08 ENCOUNTER — HOSPITAL ENCOUNTER (OUTPATIENT)
Dept: MRI IMAGING | Facility: HOSPITAL | Age: 46
Discharge: HOME | End: 2021-09-08
Admitting: FAMILY MEDICINE

## 2021-09-08 ENCOUNTER — APPOINTMENT (OUTPATIENT)
Dept: MRI IMAGING | Facility: HOSPITAL | Age: 46
End: 2021-09-08

## 2021-09-08 DIAGNOSIS — M54.2 CERVICALGIA: ICD-10-CM

## 2021-09-08 DIAGNOSIS — R20.2 PARESTHESIA: ICD-10-CM

## 2021-09-08 PROCEDURE — 72141 MRI NECK SPINE W/O DYE: CPT

## 2021-09-27 ENCOUNTER — TELEPHONE (OUTPATIENT)
Dept: NEUROLOGY | Facility: OTHER | Age: 46
End: 2021-09-27

## 2021-09-27 NOTE — TELEPHONE ENCOUNTER
Patient called to ask about appointments for getting emg done with . States he is going to have  send auth/referral to us.

## 2021-09-30 ENCOUNTER — PROCEDURE VISIT (OUTPATIENT)
Dept: NEUROLOGY | Facility: CLINIC | Age: 46
End: 2021-09-30

## 2021-09-30 VITALS
BODY MASS INDEX: 27.11 KG/M2 | DIASTOLIC BLOOD PRESSURE: 89 MMHG | WEIGHT: 183 LBS | HEIGHT: 69 IN | SYSTOLIC BLOOD PRESSURE: 145 MMHG | HEART RATE: 83 BPM

## 2021-09-30 DIAGNOSIS — G56.01 CARPAL TUNNEL SYNDROME OF RIGHT WRIST: Primary | ICD-10-CM

## 2021-09-30 DIAGNOSIS — M54.12 CERVICAL RADICULOPATHY, CHRONIC: ICD-10-CM

## 2021-09-30 PROBLEM — R20.0 NUMBNESS AND TINGLING: Status: ACTIVE | Noted: 2021-09-30

## 2021-09-30 PROBLEM — R20.2 NUMBNESS AND TINGLING: Status: ACTIVE | Noted: 2021-09-30

## 2021-09-30 PROCEDURE — 95910 NRV CNDJ TEST 7-8 STUDIES: CPT | Performed by: PSYCHIATRY & NEUROLOGY

## 2021-09-30 PROCEDURE — 99202 OFFICE O/P NEW SF 15 MIN: CPT | Performed by: PSYCHIATRY & NEUROLOGY

## 2021-09-30 NOTE — ASSESSMENT & PLAN NOTE
He has chronic cervical radiculopathy based on previous EMG that was performed by another neurologist.  This is also evident on MRI of his cervical spine that he has multiple levels of neuroforaminal narrowing.  He is supposed to see a neurosurgeon in [place].  I recommended him not to have surgery to the cervical spine this time since he does not have progressive pain, weakness or numbness.

## 2021-09-30 NOTE — ASSESSMENT & PLAN NOTE
Nerve conduction study is abnormal and shows electrophysiologic evidence for mild right carpal tunnel syndrome.

## 2021-09-30 NOTE — PROGRESS NOTES
"Chief Complaint  Numbness (BUE), Pain (BUE), and Extremity Weakness (BUE)    Subjective          Ke House is a 46 y.o. male who presents to [facility]  History of Present Illness  46-year-old man evaluated for left arm numbness.  He states that the numbness is from his neck all the way to his hand.  I reviewed with him the MRI of the cervical spine showing several levels of neuroforaminal narrowing.  At C4-5 there is moderate to severe bilateral neural foramina narrowing.  At C5-C6 there is moderate bilateral neuroforaminal.  At C6-C7 there is severe right and moderate left neuroforaminal narrowing.  At C7-T1 there is no significant neuroforaminal narrowing.    He is not complaining of progressive pain, weakness or numbness of his upper extremities.  Objective   Vital Signs:   /89   Pulse 83   Ht 175.3 cm (69\")   Wt 83 kg (183 lb)   BMI 27.02 kg/m²     Physical Exam   There is no weakness of the upper extremities on individual muscle testing.        Assessment and Plan  Diagnoses and all orders for this visit:    1. Carpal tunnel syndrome of right wrist (Primary)  Assessment & Plan:  Nerve conduction study is abnormal and shows electrophysiologic evidence for mild right carpal tunnel syndrome.      2. Cervical radiculopathy, chronic  Assessment & Plan:  He has chronic cervical radiculopathy based on previous EMG that was performed by another neurologist.  This is also evident on MRI of his cervical spine that he has multiple levels of neuroforaminal narrowing.  He is supposed to see a neurosurgeon in [place].  I recommended him not to have surgery to the cervical spine this time since he does not have progressive pain, weakness or numbness.         Nerve Conduction Study:  8 nerves     EMG:  Not done    Total time spent with the patient and coordinating patient care was 15 minutes.    Follow Up  No follow-ups on file.  Patient was given instructions and " counseling regarding his condition or for health maintenance advice. Please see specific information pulled into the AVS if appropriate.